# Patient Record
Sex: FEMALE | Race: WHITE | NOT HISPANIC OR LATINO | URBAN - METROPOLITAN AREA
[De-identification: names, ages, dates, MRNs, and addresses within clinical notes are randomized per-mention and may not be internally consistent; named-entity substitution may affect disease eponyms.]

---

## 2022-12-27 ENCOUNTER — OFFICE (OUTPATIENT)
Dept: URBAN - METROPOLITAN AREA CLINIC 105 | Facility: CLINIC | Age: 62
End: 2022-12-27

## 2022-12-27 VITALS
DIASTOLIC BLOOD PRESSURE: 70 MMHG | HEIGHT: 65 IN | WEIGHT: 157 LBS | SYSTOLIC BLOOD PRESSURE: 120 MMHG | TEMPERATURE: 99 F | HEART RATE: 65 BPM

## 2022-12-27 DIAGNOSIS — K51.90 ULCERATIVE COLITIS, UNSPECIFIED, WITHOUT COMPLICATIONS: ICD-10-CM

## 2022-12-27 DIAGNOSIS — K21.9 GASTRO-ESOPHAGEAL REFLUX DISEASE WITHOUT ESOPHAGITIS: ICD-10-CM

## 2022-12-27 DIAGNOSIS — K58.9 IRRITABLE BOWEL SYNDROME WITHOUT DIARRHEA: ICD-10-CM

## 2022-12-27 DIAGNOSIS — K62.5 HEMORRHAGE OF ANUS AND RECTUM: ICD-10-CM

## 2022-12-27 PROCEDURE — 99203 OFFICE O/P NEW LOW 30 MIN: CPT

## 2022-12-27 RX ORDER — SODIUM SULFATE, MAGNESIUM SULFATE, AND POTASSIUM CHLORIDE 17.75; 2.7; 2.25 G/1; G/1; G/1
TABLET ORAL
Qty: 1 | Refills: 0 | Status: ACTIVE
Start: 2022-12-27

## 2022-12-27 NOTE — SERVICENOTES
– Ordered blood work
– Ordered colonoscopy
– We will obtain a copy of your prior GI records and endoscopy reports
– Continue fiber supplements and probiotics
–Continue omeprazole 40 mg daily, 30 to 60 minutes before breakfast for reflux

## 2022-12-27 NOTE — SERVICEHPINOTES
Marilyn Lunsfordghada   is seen for an initial visit today. Patient presents to establish care for ulcerative colitis and irritable bowel syndrome. Patient previously lived in [facility] for 40 years. She states that she was previously diagnosed with ulcerative colitis and irritable bowel syndrome. She was ultimately placed on disability due to symptoms from ulcerative colitis. She states that she was diagnosed with ulcerative colitis at age 57. There was some thought that she may have had Crohn's or the flu at that time. She had a colonoscopy with Dr. Gricelda Jeffers in [place] and was diagnosed with ulcerative colitis. She was previously on Humira but she states that this did not work. She last had a colonoscopy 11/2021 in [place] that she states was improved but still had some ulcerations. She takes a fiber supplement and probiotics. She states that she has a flareup she takes prednisone. She occasionally has loose stool. Also occasionally with perianal pain with bowel movements. She reports rare blood in stool with wiping. She denies black stool. She does not take any NSAIDs and occasionally takes Tylenol. She occasionally has joint pains. She denies any red eyes or painful eyes. She recently had cataract surgery. She takes omeprazole daily for reflux with good control of her symptoms. She previously had an EGD before 2018. She previously had a very stressful job. She denies family history of colon cancer, celiac disease or inflammatory bowel disease.    
br
brPatient was seen by PCP–Dr. Yu Murguia 11/15/2022. She had recently moved from [place] less than 1 year ago. She has a history of ulcerative colitis/herbal bowel syndrome. She has a history of GERD on omeprazole 40 mg daily. She had a DEXA in 2022 that was reportedly normal.

## 2023-01-23 ENCOUNTER — AMBULATORY SURGICAL CENTER (OUTPATIENT)
Dept: URBAN - METROPOLITAN AREA SURGERY 26 | Facility: SURGERY | Age: 63
End: 2023-01-23
Payer: MEDICARE

## 2023-01-23 ENCOUNTER — OFFICE (OUTPATIENT)
Dept: URBAN - METROPOLITAN AREA PATHOLOGY 24 | Facility: PATHOLOGY | Age: 63
End: 2023-01-23
Payer: MEDICARE

## 2023-01-23 DIAGNOSIS — K51.90 ULCERATIVE COLITIS, UNSPECIFIED, WITHOUT COMPLICATIONS: ICD-10-CM

## 2023-01-23 DIAGNOSIS — K52.9 NONINFECTIVE GASTROENTERITIS AND COLITIS, UNSPECIFIED: ICD-10-CM

## 2023-01-23 PROCEDURE — 88342 IMHCHEM/IMCYTCHM 1ST ANTB: CPT | Performed by: PATHOLOGY

## 2023-01-23 PROCEDURE — 45380 COLONOSCOPY AND BIOPSY: CPT

## 2023-01-23 PROCEDURE — 88305 TISSUE EXAM BY PATHOLOGIST: CPT | Performed by: PATHOLOGY
